# Patient Record
Sex: FEMALE | Race: WHITE | NOT HISPANIC OR LATINO | Employment: FULL TIME | URBAN - NONMETROPOLITAN AREA
[De-identification: names, ages, dates, MRNs, and addresses within clinical notes are randomized per-mention and may not be internally consistent; named-entity substitution may affect disease eponyms.]

---

## 2018-11-25 ENCOUNTER — APPOINTMENT (OUTPATIENT)
Dept: GENERAL RADIOLOGY | Facility: HOSPITAL | Age: 48
End: 2018-11-25

## 2018-11-25 ENCOUNTER — HOSPITAL ENCOUNTER (EMERGENCY)
Facility: HOSPITAL | Age: 48
Discharge: HOME | End: 2018-11-25
Attending: EMERGENCY MEDICINE | Admitting: EMERGENCY MEDICINE

## 2018-11-25 VITALS
OXYGEN SATURATION: 100 % | HEART RATE: 80 BPM | WEIGHT: 220 LBS | BODY MASS INDEX: 36.65 KG/M2 | DIASTOLIC BLOOD PRESSURE: 96 MMHG | RESPIRATION RATE: 16 BRPM | HEIGHT: 65 IN | TEMPERATURE: 97.9 F | SYSTOLIC BLOOD PRESSURE: 158 MMHG

## 2018-11-25 DIAGNOSIS — S93.601A SPRAIN OF RIGHT FOOT, INITIAL ENCOUNTER: Primary | ICD-10-CM

## 2018-11-25 PROCEDURE — 73620 X-RAY EXAM OF FOOT: CPT

## 2018-11-25 PROCEDURE — 99283 EMERGENCY DEPT VISIT LOW MDM: CPT

## 2018-11-25 PROCEDURE — 73620 X-RAY EXAM OF FOOT: CPT | Performed by: RADIOLOGY

## 2018-11-25 NOTE — ED PROVIDER NOTES
Subjective     History provided by:  Patient   used: No    Lower Extremity Issue   Location:  Foot  Time since incident:  2 hours  Injury: yes    Mechanism of injury comment:  Slipped on water in the kitchen, foot went underneath stove  Foot location:  Dorsum of R foot  Pain details:     Quality:  Aching and throbbing    Radiates to:  Does not radiate    Severity:  Moderate    Timing:  Constant    Progression:  Unchanged  Chronicity:  New  Dislocation: no    Prior injury to area:  No  Relieved by:  Nothing  Worsened by:  Bearing weight  Ineffective treatments:  None tried  Associated symptoms: decreased ROM and swelling    Associated symptoms: no fever    Risk factors: obesity        Review of Systems   Constitutional: Negative.  Negative for fever.   HENT: Negative.    Respiratory: Negative.    Cardiovascular: Negative.  Negative for chest pain.   Gastrointestinal: Negative.  Negative for abdominal pain.   Endocrine: Negative.    Genitourinary: Negative.  Negative for dysuria.   Musculoskeletal:        (+) right foot pain and swelling   Skin: Negative.    Neurological: Negative.    Psychiatric/Behavioral: Negative.    All other systems reviewed and are negative.      No past medical history on file.    No Known Allergies    No past surgical history on file.    No family history on file.    Social History     Socioeconomic History   • Marital status:      Spouse name: Not on file   • Number of children: Not on file   • Years of education: Not on file   • Highest education level: Not on file           Objective   Physical Exam   Constitutional: She is oriented to person, place, and time. She appears well-developed and well-nourished. No distress.   HENT:   Head: Normocephalic and atraumatic.   Right Ear: External ear normal.   Left Ear: External ear normal.   Nose: Nose normal.   Eyes: Conjunctivae and EOM are normal. Pupils are equal, round, and reactive to light.   Neck: Normal range of  motion. Neck supple. No JVD present. No tracheal deviation present.   Cardiovascular: Normal rate, regular rhythm and normal heart sounds.   No murmur heard.  Pulmonary/Chest: Effort normal and breath sounds normal. No respiratory distress. She has no wheezes.   Abdominal: Soft. Bowel sounds are normal. There is no tenderness.   Musculoskeletal: She exhibits no edema or deformity.        Right foot: There is decreased range of motion, tenderness, bony tenderness and swelling. There is no deformity.        Neurological: She is alert and oriented to person, place, and time. No cranial nerve deficit.   Skin: Skin is warm and dry. No rash noted. She is not diaphoretic. No erythema. No pallor.   Psychiatric: She has a normal mood and affect. Her behavior is normal. Thought content normal.   Nursing note and vitals reviewed.      Splint - Cast - Strapping  Date/Time: 11/25/2018 1:42 AM  Performed by: Pilar Harper PA-C  Authorized by: Mor Silver MD     Consent:     Consent obtained:  Verbal    Consent given by:  Patient    Risks discussed:  Discoloration, numbness, pain and swelling    Alternatives discussed:  No treatment, delayed treatment, alternative treatment, observation and referral  Universal protocol:     Procedure explained and questions answered to patient or proxy's satisfaction: yes      Relevant documents present and verified: yes      Test results available and properly labeled: yes      Imaging studies available: yes      Required blood products, implants, devices, and special equipment available: yes      Site/side marked: yes      Immediately prior to procedure a time out was called: yes      Patient identity confirmed:  Verbally with patient, provided demographic data, arm band and hospital-assigned identification number  Pre-procedure details:     Sensation:  Normal    Skin color:  Warm pink  Procedure details:     Laterality:  Right    Location:  Foot    Foot:  R foot    Cast type:   Short leg walking  Post-procedure details:     Pain:  Unchanged    Sensation:  Normal    Skin color:  Warm pink    Patient tolerance of procedure:  Tolerated well, no immediate complications  Comments:      Patient tolerated splint application well. No acute complications. Neurovascularly intact.                 ED Course  ED Course as of Nov 25 0153   Sun Nov 25, 2018   0143 Per Dr. Silver, there is no acute bony abnormality. XR Foot 2 View Right [TK]      ED Course User Index  [TK] Pilar Harper PA-C                  MDM  Number of Diagnoses or Management Options     Amount and/or Complexity of Data Reviewed  Tests in the radiology section of CPT®: ordered and reviewed  Discuss the patient with other providers: yes (Nadeem)    Risk of Complications, Morbidity, and/or Mortality  Presenting problems: moderate  Diagnostic procedures: moderate  Management options: moderate          Final diagnoses:   Sprain of right foot, initial encounter            Pilar Harper PA-C  11/25/18 0153

## 2019-01-23 ENCOUNTER — HOSPITAL ENCOUNTER (OUTPATIENT)
Dept: MAMMOGRAPHY | Facility: HOSPITAL | Age: 49
Discharge: HOME | End: 2019-01-23
Admitting: NURSE PRACTITIONER

## 2019-01-23 DIAGNOSIS — Z12.39 SCREENING BREAST EXAMINATION: ICD-10-CM

## 2019-01-23 PROCEDURE — 77063 BREAST TOMOSYNTHESIS BI: CPT

## 2019-01-23 PROCEDURE — 77067 SCR MAMMO BI INCL CAD: CPT | Performed by: RADIOLOGY

## 2019-01-23 PROCEDURE — 77063 BREAST TOMOSYNTHESIS BI: CPT | Performed by: RADIOLOGY

## 2019-01-23 PROCEDURE — 77067 SCR MAMMO BI INCL CAD: CPT

## 2020-03-11 ENCOUNTER — HOSPITAL ENCOUNTER (OUTPATIENT)
Dept: MAMMOGRAPHY | Facility: HOSPITAL | Age: 50
Discharge: HOME | End: 2020-03-11
Admitting: NURSE PRACTITIONER

## 2020-03-11 DIAGNOSIS — Z12.31 VISIT FOR SCREENING MAMMOGRAM: ICD-10-CM

## 2020-03-11 PROCEDURE — 77067 SCR MAMMO BI INCL CAD: CPT | Performed by: RADIOLOGY

## 2020-03-11 PROCEDURE — 77063 BREAST TOMOSYNTHESIS BI: CPT

## 2020-03-11 PROCEDURE — 77067 SCR MAMMO BI INCL CAD: CPT

## 2020-03-11 PROCEDURE — 77063 BREAST TOMOSYNTHESIS BI: CPT | Performed by: RADIOLOGY

## 2021-01-21 ENCOUNTER — OFFICE VISIT (OUTPATIENT)
Dept: PSYCHIATRY | Facility: CLINIC | Age: 51
End: 2021-01-21

## 2021-01-21 DIAGNOSIS — F41.9 ANXIETY DISORDER, UNSPECIFIED TYPE: Primary | ICD-10-CM

## 2021-01-21 PROCEDURE — 90791 PSYCH DIAGNOSTIC EVALUATION: CPT | Performed by: SOCIAL WORKER

## 2021-01-21 NOTE — PROGRESS NOTES
"IDENTIFYING INFORMATION:   The patient, Mary King, is a 50 y.o. female,  29 yrs., employed, mother of 2 children,  who is here today for initial appointment starting at 10:15 am. and ending at 11:35 am. at [facility]     CHIEF COMPLIANT: Referred by her primary care physician for further assessment of ADD.  \"I have problems remembering things. I am concerned it may be early onset for dementia.  My PCP thinks it could be ADD\"     HPI: Patient comes in reporting that she has been having problems remembering things to the point that it is affecting her work at present time.  Patient reports she discussed with her provider because she was very concerned about having early onset of dementia and her primary care physician is referring her for further assessment to possibly be ADD instead.  Patient reports that she is easily distracted and having great difficulty completing her work from home.  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  Patient reports that she feels she has always had some difficulty with her memory.  Patient reports that she was able to do her job okay up until she was working at home.  Patient denies having any sleep difficulties and sleeps 8 to 10 hours per night.  Patient reports having another stressor of dealing with her mother who is also having memory problems and is concerned about her.  Patient reports she is always felt anxious her entire life.  Patient reports that she has a 20-year-old and a 14-year-old who are doing well.  Patient reports that at one time her son had been diagnosed with ADHD but did not follow through with any treatment and is doing okay today.  Patient reports that her mother does have some depression and anxiety.  Patient states she does not feel depressed.  Patient denies ever having any " thoughts to harm herself or others, denies any delusions or hallucinations, denies having any other stressors at present time.    PAST PSYCH HISTORY: None    SUBSTANCE ABUSE HISTORY: None    FAMILY HISTORY: Born and raised in [place]. Has a bachelors degree from [employer] in [place].  Patient has been employed for 18-1/2 years through the [email or web address].  Patient reports that her mother does have some depression and anxiety.  Patient denies anyone in the family having ADHD and that her son only received treatment for several months for it and then has been fine ever since.    MEDICAL HISTORY: 2 C sections, Tubal, gall bladder removal, ulcer, tonsil removal    CURRENT MEDICATIONS:  No current outpatient medications on file.     No current facility-administered medications for this visit.            MENTAL STATUS EXAM:   Hygiene:   good  Cooperation:  Cooperative  Eye Contact:  Good  Psychomotor Behavior:  Appropriate  Affect:  Full range  Hopelessness: Denies  Speech:  Normal  Thought Process:  Goal directed  Thought Content:  Normal  Suicidal:  None  Homicidal:  None  Hallucinations:  None  Delusion:  None  Memory:  Deficits  Orientation:  Person, Place, Time and Situation  Reliability:  good  Insight:  Good  Judgement:  Good  Impulse Control:  Good  Physical/Medical Issues:  No     PROBLEM LIST:   Memory problems, anxious     STRENGTHS:    patient appears motivated for treatment is currently engaged and compliant    WEAKNESSES:   worries    SHORT-TERM GOALS: Patient will follow-up with her primary care physician regarding her anxiety.  Patient  will report decrease of symptoms and frequency.    LONG-TERM GOALS: Patient will have minimal symptoms of  with continued medication management. Patient will be compliant with treatment and appointments.     DIAGNOSIS:   Encounter Diagnosis   Name Primary?   • Anxiety disorder, unspecified type Yes     Anxiety unspecified type.    PLAN:   Patient was administered the Rima  performance test which showed no problems for attention deficit hyperactivity disorder.  Patient was also administered the adult ADHD self-report scale which also indicated no problems of having ADHD.  Discussed with patient to follow-up with her primary care physician for her ongoing medication and having possible ongoing anxiety.       The patient was instructed to call clinic as needed or go to ER if in crisis.       HAILEY VILLANUEVA LCSW, [facility]

## 2025-04-22 ENCOUNTER — TRANSCRIBE ORDERS (OUTPATIENT)
Dept: ADMINISTRATIVE | Facility: HOSPITAL | Age: 55
End: 2025-04-22
Payer: COMMERCIAL

## 2025-04-22 DIAGNOSIS — Z12.31 VISIT FOR SCREENING MAMMOGRAM: Primary | ICD-10-CM

## 2025-05-05 ENCOUNTER — HOSPITAL ENCOUNTER (OUTPATIENT)
Dept: MAMMOGRAPHY | Facility: HOSPITAL | Age: 55
Discharge: HOME | End: 2025-05-05
Admitting: OBSTETRICS & GYNECOLOGY
Payer: COMMERCIAL

## 2025-05-05 DIAGNOSIS — Z12.31 VISIT FOR SCREENING MAMMOGRAM: ICD-10-CM

## 2025-05-05 PROCEDURE — 77067 SCR MAMMO BI INCL CAD: CPT

## 2025-05-05 PROCEDURE — 77067 SCR MAMMO BI INCL CAD: CPT | Performed by: RADIOLOGY

## 2025-05-05 PROCEDURE — 77063 BREAST TOMOSYNTHESIS BI: CPT | Performed by: RADIOLOGY

## 2025-05-05 PROCEDURE — 77063 BREAST TOMOSYNTHESIS BI: CPT
